# Patient Record
Sex: MALE | Race: WHITE | Employment: UNEMPLOYED | URBAN - METROPOLITAN AREA
[De-identification: names, ages, dates, MRNs, and addresses within clinical notes are randomized per-mention and may not be internally consistent; named-entity substitution may affect disease eponyms.]

---

## 2021-12-26 ENCOUNTER — HOSPITAL ENCOUNTER (EMERGENCY)
Age: 56
Discharge: HOME | End: 2021-12-26
Attending: EMERGENCY MEDICINE
Payer: COMMERCIAL

## 2021-12-26 ENCOUNTER — APPOINTMENT (OUTPATIENT)
Dept: GENERAL RADIOLOGY | Age: 56
End: 2021-12-26
Payer: COMMERCIAL

## 2021-12-26 VITALS
RESPIRATION RATE: 16 BRPM | WEIGHT: 275 LBS | OXYGEN SATURATION: 98 % | DIASTOLIC BLOOD PRESSURE: 88 MMHG | SYSTOLIC BLOOD PRESSURE: 138 MMHG | TEMPERATURE: 96.7 F | HEART RATE: 89 BPM | HEIGHT: 72 IN | BODY MASS INDEX: 37.25 KG/M2

## 2021-12-26 DIAGNOSIS — R05.9 COUGH: ICD-10-CM

## 2021-12-26 DIAGNOSIS — Z20.822 ENCOUNTER FOR LABORATORY TESTING FOR COVID-19 VIRUS: Primary | ICD-10-CM

## 2021-12-26 DIAGNOSIS — Z87.09 HISTORY OF COPD: ICD-10-CM

## 2021-12-26 PROCEDURE — 6370000000 HC RX 637 (ALT 250 FOR IP): Performed by: EMERGENCY MEDICINE

## 2021-12-26 PROCEDURE — 99284 EMERGENCY DEPT VISIT MOD MDM: CPT

## 2021-12-26 PROCEDURE — U0005 INFEC AGEN DETEC AMPLI PROBE: HCPCS

## 2021-12-26 PROCEDURE — U0003 INFECTIOUS AGENT DETECTION BY NUCLEIC ACID (DNA OR RNA); SEVERE ACUTE RESPIRATORY SYNDROME CORONAVIRUS 2 (SARS-COV-2) (CORONAVIRUS DISEASE [COVID-19]), AMPLIFIED PROBE TECHNIQUE, MAKING USE OF HIGH THROUGHPUT TECHNOLOGIES AS DESCRIBED BY CMS-2020-01-R: HCPCS

## 2021-12-26 PROCEDURE — 71046 X-RAY EXAM CHEST 2 VIEWS: CPT

## 2021-12-26 RX ORDER — BENZONATATE 100 MG/1
200 CAPSULE ORAL 3 TIMES DAILY PRN
Qty: 21 CAPSULE | Refills: 0 | Status: SHIPPED | OUTPATIENT
Start: 2021-12-26 | End: 2022-01-02

## 2021-12-26 RX ORDER — ALBUTEROL SULFATE 90 UG/1
2 AEROSOL, METERED RESPIRATORY (INHALATION) EVERY 4 HOURS PRN
Qty: 18 G | Refills: 0 | Status: SHIPPED | OUTPATIENT
Start: 2021-12-26

## 2021-12-26 RX ORDER — LORATADINE AND PSEUDOEPHEDRINE SULFATE 5; 120 MG/1; MG/1
1 TABLET, EXTENDED RELEASE ORAL 2 TIMES DAILY
Qty: 20 TABLET | Refills: 0 | Status: SHIPPED | OUTPATIENT
Start: 2021-12-26

## 2021-12-26 RX ORDER — GUAIFENESIN/DEXTROMETHORPHAN 100-10MG/5
10 SYRUP ORAL ONCE
Status: COMPLETED | OUTPATIENT
Start: 2021-12-26 | End: 2021-12-26

## 2021-12-26 RX ADMIN — GUAIFENESIN SYRUP AND DEXTROMETHORPHAN 10 ML: 100; 10 SYRUP ORAL at 22:51

## 2021-12-27 LAB — SARS-COV-2, PCR: DETECTED

## 2021-12-27 NOTE — ED PROVIDER NOTES
HPI:  12/26/21, Time: 10:20 PM TERA Cerrato is a 61 y.o. male presenting to the ED for cough, beginning 3 days ago. The complaint has been persistent, mild in severity, and worsened by nothing. Patient states he is vaccinated against COVID-19 but is possibly exposed to coworkers and he states that there has been no rash of coworkers who have tested positive for COVID-19. Patient does have history of COPD but he denies any shortness of breath currently his only complaint is a cough which is been primarily nonproductive. Patient not had any reported colored sputum production or hemoptysis. Not had any nausea vomiting no chest heaviness/pressure tightness, no rashes, no loss of taste or smell. No other complaints. Review of Systems:   A complete review of systems was performed and pertinent positives and negatives are stated within HPI, all other systems reviewed and are negative.    --------------------------------------------- PAST HISTORY ---------------------------------------------  Past Medical History:  has a past medical history of Arthritis, Asthma, Back pain, COPD (chronic obstructive pulmonary disease) ([facility] [address] Depression, Diverticulitis, GERD (gastroesophageal reflux disease), Sinus problem, and Tobacco abuse. Past Surgical History:  has a past surgical history that includes Nerve Block and Tonsillectomy and adenoidectomy. Social History:  reports that he has quit smoking. His smoking use included cigarettes. He has a 60.00 pack-year smoking history. His smokeless tobacco use includes snuff. He reports current alcohol use. He reports that he does not use drugs. Family History: family history is not on file. The patients home medications have been reviewed.     Allergies: Shellfish-derived products, Ultram [tramadol], Iodine, and Lyrica [pregabalin]    -------------------------------------------------- RESULTS -------------------------------------------------  All laboratory and radiology results have been personally reviewed by myself   LABS:  No results found for this visit on 12/26/21. RADIOLOGY:  Interpreted by Radiologist.  XR CHEST (2 VW)   Final Result   No acute process. ------------------------- NURSING NOTES AND VITALS REVIEWED ---------------------------    The nursing notes within the ED encounter and vital signs as below have been reviewed. BP (!) 140/85   Pulse 97   Temp 96.7 °F (35.9 °C)   Resp 16   Ht 6' (1.829 m)   Wt 275 lb (124.7 kg)   SpO2 95%   BMI 37.30 kg/m²  Oxygen Saturation Interpretation: Normal    ---------------------------------------------------PHYSICAL EXAM--------------------------------------    Constitutional/General: Alert and oriented x3, well appearing, non toxic in NAD  Head: Normocephalic and atraumatic  Eyes: PERRL, EOMI  Mouth: Oropharynx clear, handling secretions, no trismus  Neck: Supple, full ROM, no JVD. Trachea midline  Pulmonary: Lungs clear to auscultation bilaterally, no wheezes, rales, or rhonchi. Not in respiratory distress  Cardiovascular:  Regular rate and rhythm, no murmurs, gallops, or rubs. 2+ distal pulses  Abdomen: Soft, non tender, non distended, no organomegaly no masses no guarding no rigidity normal active bowel sounds  Extremities: Moves all extremities x 4. Warm and well perfused  Skin: warm and dry without rash  Neurologic: GCS 15, cranial nerves II through XII grossly intact no focal deficits. Psych: Normal Affect    ------------------------------ ED COURSE/MEDICAL DECISION MAKING----------------------  Medications   guaiFENesin-dextromethorphan (ROBITUSSIN DM) 100-10 MG/5ML syrup 10 mL (10 mLs Oral Given 12/26/21 3492)       ED COURSE:     Medical Decision Making:   Patient understands he is to self isolate for the next 2 to 3 days until his COVID-19 viral test result returns.   Patient does not have any symptoms of exacerbation of COPD at this time so corticosteroid therapy is not felt to be warranted. I will give the patient a prescription for Proventil inhaler in the event of any shortness of breath. Patient states his inhaler at home was several years old. Counseling: The emergency provider has spoken with the patient and discussed todays results, in addition to providing specific details for the plan of care and counseling regarding the diagnosis and prognosis. Questions are answered at this time and they are agreeable with the plan.    --------------------------------- IMPRESSION AND DISPOSITION ---------------------------------    IMPRESSION  1. Encounter for laboratory testing for COVID-19 virus    2. Cough    3. History of COPD        DISPOSITION  Disposition: Discharge to home  Patient condition is stable      NOTE: This report was transcribed using voice recognition software.  Every effort was made to ensure accuracy; however, inadvertent computerized transcription errors may be present        Michael Shaikh MD  12/26/21 6286

## 2021-12-28 ENCOUNTER — CARE COORDINATION (OUTPATIENT)
Dept: CASE MANAGEMENT | Age: 56
End: 2021-12-28

## 2021-12-28 NOTE — CARE COORDINATION
Unable to leave [employer] HIPAA compliant message for patient to return call re: ED Follow-up for Covid test positive     Phone rang several times then went to busy signal.   Called again and same thing occurred.     Tanisha Nielsen, [address] [facility] [place] Coordination Transition

## 2024-05-08 ENCOUNTER — HOSPITAL ENCOUNTER (EMERGENCY)
Age: 59
Discharge: ANOTHER HOSPITAL | End: 2024-05-09
Attending: STUDENT IN AN ORGANIZED HEALTH CARE EDUCATION/TRAINING PROGRAM
Payer: COMMERCIAL

## 2024-05-08 ENCOUNTER — APPOINTMENT (OUTPATIENT)
Dept: GENERAL RADIOLOGY | Age: 59
End: 2024-05-08
Payer: COMMERCIAL

## 2024-05-08 DIAGNOSIS — D72.829 LEUKOCYTOSIS, UNSPECIFIED TYPE: ICD-10-CM

## 2024-05-08 DIAGNOSIS — J96.01 ACUTE RESPIRATORY FAILURE WITH HYPOXIA (HCC): Primary | ICD-10-CM

## 2024-05-08 DIAGNOSIS — Z87.09 HISTORY OF COPD: ICD-10-CM

## 2024-05-08 DIAGNOSIS — J18.9 PNEUMONIA DUE TO INFECTIOUS ORGANISM, UNSPECIFIED LATERALITY, UNSPECIFIED PART OF LUNG: ICD-10-CM

## 2024-05-08 LAB
ALBUMIN SERPL-MCNC: 3.9 G/DL (ref 3.5–5.2)
ALP SERPL-CCNC: 82 U/L (ref 40–129)
ALT SERPL-CCNC: 23 U/L (ref 0–40)
ANION GAP SERPL CALCULATED.3IONS-SCNC: 12 MMOL/L (ref 7–16)
AST SERPL-CCNC: 19 U/L (ref 0–39)
BASOPHILS # BLD: 0.05 K/UL (ref 0–0.2)
BASOPHILS NFR BLD: 0 % (ref 0–2)
BILIRUB SERPL-MCNC: 1 MG/DL (ref 0–1.2)
BNP SERPL-MCNC: 45 PG/ML (ref 0–125)
BUN SERPL-MCNC: 14 MG/DL (ref 6–23)
CALCIUM SERPL-MCNC: 8.8 MG/DL (ref 8.6–10.2)
CHLORIDE SERPL-SCNC: 98 MMOL/L (ref 98–107)
CO2 SERPL-SCNC: 27 MMOL/L (ref 22–29)
CREAT SERPL-MCNC: 0.8 MG/DL (ref 0.7–1.2)
EOSINOPHIL # BLD: 0 K/UL (ref 0.05–0.5)
EOSINOPHILS RELATIVE PERCENT: 0 % (ref 0–6)
ERYTHROCYTE [DISTWIDTH] IN BLOOD BY AUTOMATED COUNT: 14.1 % (ref 11.5–15)
GFR, ESTIMATED: >90 ML/MIN/1.73M2
GLUCOSE SERPL-MCNC: 244 MG/DL (ref 74–99)
HCT VFR BLD AUTO: 45.4 % (ref 37–54)
HGB BLD-MCNC: 15.1 G/DL (ref 12.5–16.5)
IMM GRANULOCYTES # BLD AUTO: 0.06 K/UL (ref 0–0.58)
IMM GRANULOCYTES NFR BLD: 0 % (ref 0–5)
INR PPP: 1.2
LACTATE BLDV-SCNC: 1.5 MMOL/L (ref 0.5–1.9)
LYMPHOCYTES NFR BLD: 1.43 K/UL (ref 1.5–4)
LYMPHOCYTES RELATIVE PERCENT: 10 % (ref 20–42)
MAGNESIUM SERPL-MCNC: 1.7 MG/DL (ref 1.6–2.6)
MCH RBC QN AUTO: 29.1 PG (ref 26–35)
MCHC RBC AUTO-ENTMCNC: 33.3 G/DL (ref 32–34.5)
MCV RBC AUTO: 87.5 FL (ref 80–99.9)
MONOCYTES NFR BLD: 1.17 K/UL (ref 0.1–0.95)
MONOCYTES NFR BLD: 8 % (ref 2–12)
NEUTROPHILS NFR BLD: 81 % (ref 43–80)
NEUTS SEG NFR BLD: 11.22 K/UL (ref 1.8–7.3)
PARTIAL THROMBOPLASTIN TIME: 36.6 SEC (ref 24.5–35.1)
PLATELET # BLD AUTO: 180 K/UL (ref 130–450)
PMV BLD AUTO: 11.2 FL (ref 7–12)
POTASSIUM SERPL-SCNC: 3.4 MMOL/L (ref 3.5–5)
PROT SERPL-MCNC: 6.9 G/DL (ref 6.4–8.3)
PROTHROMBIN TIME: 13.5 SEC (ref 9.3–12.4)
RBC # BLD AUTO: 5.19 M/UL (ref 3.8–5.8)
SODIUM SERPL-SCNC: 137 MMOL/L (ref 132–146)
TROPONIN I SERPL HS-MCNC: 7 NG/L (ref 0–11)
WBC OTHER # BLD: 13.9 K/UL (ref 4.5–11.5)

## 2024-05-08 PROCEDURE — 96375 TX/PRO/DX INJ NEW DRUG ADDON: CPT

## 2024-05-08 PROCEDURE — 0202U NFCT DS 22 TRGT SARS-COV-2: CPT

## 2024-05-08 PROCEDURE — 87636 SARSCOV2 & INF A&B AMP PRB: CPT

## 2024-05-08 PROCEDURE — 99285 EMERGENCY DEPT VISIT HI MDM: CPT

## 2024-05-08 PROCEDURE — 93005 ELECTROCARDIOGRAM TRACING: CPT | Performed by: STUDENT IN AN ORGANIZED HEALTH CARE EDUCATION/TRAINING PROGRAM

## 2024-05-08 PROCEDURE — 80053 COMPREHEN METABOLIC PANEL: CPT

## 2024-05-08 PROCEDURE — 84484 ASSAY OF TROPONIN QUANT: CPT

## 2024-05-08 PROCEDURE — 96361 HYDRATE IV INFUSION ADD-ON: CPT

## 2024-05-08 PROCEDURE — 87040 BLOOD CULTURE FOR BACTERIA: CPT

## 2024-05-08 PROCEDURE — 83735 ASSAY OF MAGNESIUM: CPT

## 2024-05-08 PROCEDURE — 87086 URINE CULTURE/COLONY COUNT: CPT

## 2024-05-08 PROCEDURE — A4216 STERILE WATER/SALINE, 10 ML: HCPCS | Performed by: STUDENT IN AN ORGANIZED HEALTH CARE EDUCATION/TRAINING PROGRAM

## 2024-05-08 PROCEDURE — 2500000003 HC RX 250 WO HCPCS: Performed by: STUDENT IN AN ORGANIZED HEALTH CARE EDUCATION/TRAINING PROGRAM

## 2024-05-08 PROCEDURE — 81001 URINALYSIS AUTO W/SCOPE: CPT

## 2024-05-08 PROCEDURE — 85730 THROMBOPLASTIN TIME PARTIAL: CPT

## 2024-05-08 PROCEDURE — 83605 ASSAY OF LACTIC ACID: CPT

## 2024-05-08 PROCEDURE — 85610 PROTHROMBIN TIME: CPT

## 2024-05-08 PROCEDURE — 85025 COMPLETE CBC W/AUTO DIFF WBC: CPT

## 2024-05-08 PROCEDURE — 2580000003 HC RX 258: Performed by: STUDENT IN AN ORGANIZED HEALTH CARE EDUCATION/TRAINING PROGRAM

## 2024-05-08 PROCEDURE — 83880 ASSAY OF NATRIURETIC PEPTIDE: CPT

## 2024-05-08 PROCEDURE — 6360000002 HC RX W HCPCS: Performed by: STUDENT IN AN ORGANIZED HEALTH CARE EDUCATION/TRAINING PROGRAM

## 2024-05-08 RX ORDER — 0.9 % SODIUM CHLORIDE 0.9 %
1000 INTRAVENOUS SOLUTION INTRAVENOUS ONCE
Status: COMPLETED | OUTPATIENT
Start: 2024-05-08 | End: 2024-05-09

## 2024-05-08 RX ORDER — DIPHENHYDRAMINE HYDROCHLORIDE 50 MG/ML
25 INJECTION INTRAMUSCULAR; INTRAVENOUS ONCE
Status: COMPLETED | OUTPATIENT
Start: 2024-05-08 | End: 2024-05-08

## 2024-05-08 RX ADMIN — SODIUM CHLORIDE 1000 ML: 9 INJECTION, SOLUTION INTRAVENOUS at 23:24

## 2024-05-08 RX ADMIN — DIPHENHYDRAMINE HYDROCHLORIDE 25 MG: 50 INJECTION INTRAMUSCULAR; INTRAVENOUS at 23:27

## 2024-05-08 RX ADMIN — FAMOTIDINE 20 MG: 10 INJECTION, SOLUTION INTRAVENOUS at 23:30

## 2024-05-08 RX ADMIN — WATER 125 MG: 1 INJECTION INTRAMUSCULAR; INTRAVENOUS; SUBCUTANEOUS at 23:25

## 2024-05-08 ASSESSMENT — PAIN DESCRIPTION - ONSET: ONSET: ON-GOING

## 2024-05-08 ASSESSMENT — PAIN DESCRIPTION - ORIENTATION: ORIENTATION: MID

## 2024-05-08 ASSESSMENT — PAIN DESCRIPTION - DESCRIPTORS: DESCRIPTORS: ACHING;SHARP;SORE

## 2024-05-08 ASSESSMENT — PAIN - FUNCTIONAL ASSESSMENT
PAIN_FUNCTIONAL_ASSESSMENT: ACTIVITIES ARE NOT PREVENTED
PAIN_FUNCTIONAL_ASSESSMENT: NONE - DENIES PAIN
PAIN_FUNCTIONAL_ASSESSMENT: 0-10

## 2024-05-08 ASSESSMENT — PAIN DESCRIPTION - LOCATION: LOCATION: ABDOMEN;CHEST

## 2024-05-08 ASSESSMENT — PAIN DESCRIPTION - PAIN TYPE: TYPE: ACUTE PAIN

## 2024-05-08 ASSESSMENT — PAIN SCALES - GENERAL: PAINLEVEL_OUTOF10: 7

## 2024-05-08 ASSESSMENT — PAIN DESCRIPTION - FREQUENCY: FREQUENCY: CONTINUOUS

## 2024-05-09 ENCOUNTER — HOSPITAL ENCOUNTER (INPATIENT)
Age: 59
LOS: 2 days | Discharge: HOME | End: 2024-05-11
Attending: INTERNAL MEDICINE | Admitting: FAMILY MEDICINE
Payer: COMMERCIAL

## 2024-05-09 ENCOUNTER — APPOINTMENT (OUTPATIENT)
Dept: GENERAL RADIOLOGY | Age: 59
End: 2024-05-09
Payer: COMMERCIAL

## 2024-05-09 ENCOUNTER — APPOINTMENT (OUTPATIENT)
Dept: CT IMAGING | Age: 59
End: 2024-05-09
Payer: COMMERCIAL

## 2024-05-09 VITALS
WEIGHT: 263 LBS | HEIGHT: 72 IN | SYSTOLIC BLOOD PRESSURE: 104 MMHG | OXYGEN SATURATION: 94 % | RESPIRATION RATE: 24 BRPM | BODY MASS INDEX: 35.62 KG/M2 | TEMPERATURE: 99.9 F | HEART RATE: 84 BPM | DIASTOLIC BLOOD PRESSURE: 59 MMHG

## 2024-05-09 PROBLEM — J15.9 COMMUNITY ACQUIRED BACTERIAL PNEUMONIA: Status: ACTIVE | Noted: 2024-05-09

## 2024-05-09 LAB
B PARAP IS1001 DNA NPH QL NAA+NON-PROBE: NOT DETECTED
B PERT DNA SPEC QL NAA+PROBE: NOT DETECTED
BILIRUB UR QL STRIP: NEGATIVE
C PNEUM DNA NPH QL NAA+NON-PROBE: NOT DETECTED
CLARITY UR: CLEAR
COLOR UR: YELLOW
FLUAV RNA NPH QL NAA+NON-PROBE: NOT DETECTED
FLUAV RNA RESP QL NAA+PROBE: NOT DETECTED
FLUBV RNA NPH QL NAA+NON-PROBE: NOT DETECTED
FLUBV RNA RESP QL NAA+PROBE: NOT DETECTED
GLUCOSE UR STRIP-MCNC: NEGATIVE MG/DL
HADV DNA NPH QL NAA+NON-PROBE: NOT DETECTED
HCOV 229E RNA NPH QL NAA+NON-PROBE: NOT DETECTED
HCOV HKU1 RNA NPH QL NAA+NON-PROBE: NOT DETECTED
HCOV NL63 RNA NPH QL NAA+NON-PROBE: NOT DETECTED
HCOV OC43 RNA NPH QL NAA+NON-PROBE: NOT DETECTED
HGB UR QL STRIP.AUTO: NEGATIVE
HMPV RNA NPH QL NAA+NON-PROBE: DETECTED
HPIV1 RNA NPH QL NAA+NON-PROBE: NOT DETECTED
HPIV2 RNA NPH QL NAA+NON-PROBE: NOT DETECTED
HPIV3 RNA NPH QL NAA+NON-PROBE: NOT DETECTED
HPIV4 RNA NPH QL NAA+NON-PROBE: NOT DETECTED
KETONES UR STRIP-MCNC: 15 MG/DL
LACTATE BLDV-SCNC: 1 MMOL/L (ref 0.5–1.9)
LEUKOCYTE ESTERASE UR QL STRIP: NEGATIVE
M PNEUMO DNA NPH QL NAA+NON-PROBE: NOT DETECTED
NITRITE UR QL STRIP: NEGATIVE
PH UR STRIP: 6 [PH] (ref 5–9)
PROT UR STRIP-MCNC: NEGATIVE MG/DL
RBC #/AREA URNS HPF: ABNORMAL /HPF
RSV RNA NPH QL NAA+NON-PROBE: NOT DETECTED
RV+EV RNA NPH QL NAA+NON-PROBE: NOT DETECTED
SARS-COV-2 RNA NPH QL NAA+NON-PROBE: NOT DETECTED
SARS-COV-2 RNA RESP QL NAA+PROBE: NOT DETECTED
SOURCE: NORMAL
SP GR UR STRIP: <1.005 (ref 1–1.03)
SPECIMEN DESCRIPTION: ABNORMAL
SPECIMEN DESCRIPTION: NORMAL
UROBILINOGEN UR STRIP-ACNC: 0.2 EU/DL (ref 0–1)
WBC #/AREA URNS HPF: ABNORMAL /HPF

## 2024-05-09 PROCEDURE — 6360000002 HC RX W HCPCS: Performed by: STUDENT IN AN ORGANIZED HEALTH CARE EDUCATION/TRAINING PROGRAM

## 2024-05-09 PROCEDURE — 6360000004 HC RX CONTRAST MEDICATION: Performed by: RADIOLOGY

## 2024-05-09 PROCEDURE — 2700000000 HC OXYGEN THERAPY PER DAY

## 2024-05-09 PROCEDURE — 2580000003 HC RX 258: Performed by: STUDENT IN AN ORGANIZED HEALTH CARE EDUCATION/TRAINING PROGRAM

## 2024-05-09 PROCEDURE — 2580000003 HC RX 258

## 2024-05-09 PROCEDURE — 87205 SMEAR GRAM STAIN: CPT

## 2024-05-09 PROCEDURE — 87449 NOS EACH ORGANISM AG IA: CPT

## 2024-05-09 PROCEDURE — 71045 X-RAY EXAM CHEST 1 VIEW: CPT

## 2024-05-09 PROCEDURE — 97165 OT EVAL LOW COMPLEX 30 MIN: CPT

## 2024-05-09 PROCEDURE — 6370000000 HC RX 637 (ALT 250 FOR IP): Performed by: STUDENT IN AN ORGANIZED HEALTH CARE EDUCATION/TRAINING PROGRAM

## 2024-05-09 PROCEDURE — 87899 AGENT NOS ASSAY W/OPTIC: CPT

## 2024-05-09 PROCEDURE — 2500000003 HC RX 250 WO HCPCS: Performed by: STUDENT IN AN ORGANIZED HEALTH CARE EDUCATION/TRAINING PROGRAM

## 2024-05-09 PROCEDURE — 96375 TX/PRO/DX INJ NEW DRUG ADDON: CPT

## 2024-05-09 PROCEDURE — 6370000000 HC RX 637 (ALT 250 FOR IP)

## 2024-05-09 PROCEDURE — 2060000000 HC ICU INTERMEDIATE R&B

## 2024-05-09 PROCEDURE — 94664 DEMO&/EVAL PT USE INHALER: CPT

## 2024-05-09 PROCEDURE — 71275 CT ANGIOGRAPHY CHEST: CPT

## 2024-05-09 PROCEDURE — 94640 AIRWAY INHALATION TREATMENT: CPT

## 2024-05-09 PROCEDURE — 96365 THER/PROPH/DIAG IV INF INIT: CPT

## 2024-05-09 PROCEDURE — 83605 ASSAY OF LACTIC ACID: CPT

## 2024-05-09 PROCEDURE — 87070 CULTURE OTHR SPECIMN AEROBIC: CPT

## 2024-05-09 RX ORDER — SODIUM CHLORIDE 9 MG/ML
INJECTION, SOLUTION INTRAVENOUS CONTINUOUS
Status: DISCONTINUED | OUTPATIENT
Start: 2024-05-09 | End: 2024-05-10

## 2024-05-09 RX ORDER — ACETAMINOPHEN 650 MG/1
650 SUPPOSITORY RECTAL EVERY 6 HOURS PRN
Status: CANCELLED | OUTPATIENT
Start: 2024-05-09

## 2024-05-09 RX ORDER — IPRATROPIUM BROMIDE AND ALBUTEROL SULFATE 2.5; .5 MG/3ML; MG/3ML
1 SOLUTION RESPIRATORY (INHALATION)
Status: CANCELLED | OUTPATIENT
Start: 2024-05-09

## 2024-05-09 RX ORDER — ACETAMINOPHEN 650 MG/1
650 SUPPOSITORY RECTAL EVERY 6 HOURS PRN
Status: DISCONTINUED | OUTPATIENT
Start: 2024-05-09 | End: 2024-05-11 | Stop reason: HOSPADM

## 2024-05-09 RX ORDER — IPRATROPIUM BROMIDE AND ALBUTEROL SULFATE 2.5; .5 MG/3ML; MG/3ML
1 SOLUTION RESPIRATORY (INHALATION)
Status: DISCONTINUED | OUTPATIENT
Start: 2024-05-09 | End: 2024-05-11 | Stop reason: HOSPADM

## 2024-05-09 RX ORDER — DOXYCYCLINE HYCLATE 100 MG/1
100 CAPSULE ORAL EVERY 12 HOURS SCHEDULED
Status: CANCELLED | OUTPATIENT
Start: 2024-05-09 | End: 2024-05-14

## 2024-05-09 RX ORDER — ONDANSETRON 4 MG/1
4 TABLET, ORALLY DISINTEGRATING ORAL EVERY 8 HOURS PRN
Status: DISCONTINUED | OUTPATIENT
Start: 2024-05-09 | End: 2024-05-11 | Stop reason: HOSPADM

## 2024-05-09 RX ORDER — ACETAMINOPHEN 325 MG/1
650 TABLET ORAL EVERY 6 HOURS PRN
Status: CANCELLED | OUTPATIENT
Start: 2024-05-09

## 2024-05-09 RX ORDER — ONDANSETRON 4 MG/1
4 TABLET, ORALLY DISINTEGRATING ORAL EVERY 8 HOURS PRN
Status: CANCELLED | OUTPATIENT
Start: 2024-05-09

## 2024-05-09 RX ORDER — SODIUM CHLORIDE 9 MG/ML
INJECTION, SOLUTION INTRAVENOUS PRN
Status: CANCELLED | OUTPATIENT
Start: 2024-05-09

## 2024-05-09 RX ORDER — DOXYCYCLINE HYCLATE 100 MG/1
100 CAPSULE ORAL EVERY 12 HOURS SCHEDULED
Status: DISCONTINUED | OUTPATIENT
Start: 2024-05-09 | End: 2024-05-11 | Stop reason: HOSPADM

## 2024-05-09 RX ORDER — SODIUM CHLORIDE 9 MG/ML
INJECTION, SOLUTION INTRAVENOUS CONTINUOUS
Status: CANCELLED | OUTPATIENT
Start: 2024-05-09

## 2024-05-09 RX ORDER — ONDANSETRON 2 MG/ML
4 INJECTION INTRAMUSCULAR; INTRAVENOUS EVERY 6 HOURS PRN
Status: CANCELLED | OUTPATIENT
Start: 2024-05-09

## 2024-05-09 RX ORDER — ENOXAPARIN SODIUM 100 MG/ML
30 INJECTION SUBCUTANEOUS 2 TIMES DAILY
Status: CANCELLED | OUTPATIENT
Start: 2024-05-09

## 2024-05-09 RX ORDER — SODIUM CHLORIDE 0.9 % (FLUSH) 0.9 %
10 SYRINGE (ML) INJECTION PRN
Status: DISCONTINUED | OUTPATIENT
Start: 2024-05-09 | End: 2024-05-11 | Stop reason: HOSPADM

## 2024-05-09 RX ORDER — ENOXAPARIN SODIUM 100 MG/ML
30 INJECTION SUBCUTANEOUS 2 TIMES DAILY
Status: DISCONTINUED | OUTPATIENT
Start: 2024-05-09 | End: 2024-05-11 | Stop reason: HOSPADM

## 2024-05-09 RX ORDER — GUAIFENESIN/DEXTROMETHORPHAN 100-10MG/5
5 SYRUP ORAL EVERY 4 HOURS PRN
Status: DISCONTINUED | OUTPATIENT
Start: 2024-05-09 | End: 2024-05-11 | Stop reason: HOSPADM

## 2024-05-09 RX ORDER — SODIUM CHLORIDE 0.9 % (FLUSH) 0.9 %
5-40 SYRINGE (ML) INJECTION EVERY 12 HOURS SCHEDULED
Status: CANCELLED | OUTPATIENT
Start: 2024-05-09

## 2024-05-09 RX ORDER — SODIUM CHLORIDE 9 MG/ML
INJECTION, SOLUTION INTRAVENOUS PRN
Status: DISCONTINUED | OUTPATIENT
Start: 2024-05-09 | End: 2024-05-11 | Stop reason: HOSPADM

## 2024-05-09 RX ORDER — SODIUM CHLORIDE 0.9 % (FLUSH) 0.9 %
10 SYRINGE (ML) INJECTION PRN
Status: CANCELLED | OUTPATIENT
Start: 2024-05-09

## 2024-05-09 RX ORDER — ACETAMINOPHEN 325 MG/1
650 TABLET ORAL EVERY 6 HOURS PRN
Status: DISCONTINUED | OUTPATIENT
Start: 2024-05-09 | End: 2024-05-11 | Stop reason: HOSPADM

## 2024-05-09 RX ORDER — GUAIFENESIN/DEXTROMETHORPHAN 100-10MG/5
5 SYRUP ORAL EVERY 4 HOURS PRN
Status: CANCELLED | OUTPATIENT
Start: 2024-05-09

## 2024-05-09 RX ORDER — ONDANSETRON 2 MG/ML
4 INJECTION INTRAMUSCULAR; INTRAVENOUS EVERY 6 HOURS PRN
Status: DISCONTINUED | OUTPATIENT
Start: 2024-05-09 | End: 2024-05-11 | Stop reason: HOSPADM

## 2024-05-09 RX ORDER — IPRATROPIUM BROMIDE AND ALBUTEROL SULFATE 2.5; .5 MG/3ML; MG/3ML
1 SOLUTION RESPIRATORY (INHALATION) ONCE
Status: COMPLETED | OUTPATIENT
Start: 2024-05-09 | End: 2024-05-09

## 2024-05-09 RX ORDER — SODIUM CHLORIDE 0.9 % (FLUSH) 0.9 %
5-40 SYRINGE (ML) INJECTION EVERY 12 HOURS SCHEDULED
Status: DISCONTINUED | OUTPATIENT
Start: 2024-05-09 | End: 2024-05-11 | Stop reason: HOSPADM

## 2024-05-09 RX ADMIN — DOXYCYCLINE 100 MG: 100 INJECTION, POWDER, LYOPHILIZED, FOR SOLUTION INTRAVENOUS at 01:59

## 2024-05-09 RX ADMIN — IPRATROPIUM BROMIDE AND ALBUTEROL SULFATE 1 DOSE: 2.5; .5 SOLUTION RESPIRATORY (INHALATION) at 01:53

## 2024-05-09 RX ADMIN — SODIUM CHLORIDE: 9 INJECTION, SOLUTION INTRAVENOUS at 07:50

## 2024-05-09 RX ADMIN — IPRATROPIUM BROMIDE AND ALBUTEROL SULFATE 1 DOSE: 2.5; .5 SOLUTION RESPIRATORY (INHALATION) at 19:11

## 2024-05-09 RX ADMIN — IPRATROPIUM BROMIDE AND ALBUTEROL SULFATE 1 DOSE: 2.5; .5 SOLUTION RESPIRATORY (INHALATION) at 08:37

## 2024-05-09 RX ADMIN — SODIUM CHLORIDE, PRESERVATIVE FREE 10 ML: 5 INJECTION INTRAVENOUS at 07:53

## 2024-05-09 RX ADMIN — WATER 1000 MG: 1 INJECTION INTRAMUSCULAR; INTRAVENOUS; SUBCUTANEOUS at 01:53

## 2024-05-09 RX ADMIN — SODIUM CHLORIDE, PRESERVATIVE FREE 10 ML: 5 INJECTION INTRAVENOUS at 21:40

## 2024-05-09 RX ADMIN — IPRATROPIUM BROMIDE AND ALBUTEROL SULFATE 1 DOSE: 2.5; .5 SOLUTION RESPIRATORY (INHALATION) at 16:01

## 2024-05-09 RX ADMIN — DOXYCYCLINE HYCLATE 100 MG: 100 CAPSULE ORAL at 13:25

## 2024-05-09 RX ADMIN — IOPAMIDOL 75 ML: 755 INJECTION, SOLUTION INTRAVENOUS at 00:32

## 2024-05-09 RX ADMIN — SODIUM CHLORIDE: 9 INJECTION, SOLUTION INTRAVENOUS at 21:32

## 2024-05-09 RX ADMIN — IPRATROPIUM BROMIDE AND ALBUTEROL SULFATE 1 DOSE: 2.5; .5 SOLUTION RESPIRATORY (INHALATION) at 12:34

## 2024-05-09 ASSESSMENT — ENCOUNTER SYMPTOMS
VOMITING: 0
NAUSEA: 0
SHORTNESS OF BREATH: 1
ABDOMINAL PAIN: 0
CONSTIPATION: 0
DIARRHEA: 0
COUGH: 1

## 2024-05-09 ASSESSMENT — PAIN SCALES - GENERAL
PAINLEVEL_OUTOF10: 0

## 2024-05-09 ASSESSMENT — PAIN - FUNCTIONAL ASSESSMENT
PAIN_FUNCTIONAL_ASSESSMENT: NONE - DENIES PAIN

## 2024-05-09 NOTE — CARE COORDINATION
Introduced my self and provided explanation of CM role to patient.  Patient is awake, alert, and aware of current diagnosis and treatment plan including iv antibiotics, nebulizer therapy, supplemental oxygen, cardiac monitoring.  He voices he resides at home with his spouse and completes his adl's with independence.  Patient is established with a pcp and denies any issue with retail pharmaceutical coverage. He is tracking down insurance information/card d/t recent employer change.  Once available, he will provide information so it can be placed on chart. He does not have home O2; he is hopeful to be weaned to room air.  If no supplemental O2 required at discharge, patient denies home going needs.  Patient will need followed and assisted with discharge planning as necessary.   Gus Betancourt, WILIAM RN  [facility] Case Management  [phone]

## 2024-05-09 NOTE — PROGRESS NOTES
Physical Therapy  Facility/Department: [address] INTERNAL MEDICINE 2    Name: Ralph Escobar  : 1965  MRN: [id number]    Order received and chart reviewed.  Pt found independently ambulating around his room and managing his O2 line.  Pt does not need PT at this time.  Will discontinue PT order.     Yaquelin Bacon, PT [id number]

## 2024-05-09 NOTE — PROGRESS NOTES
Database initiated pharmacy and medications verified with the patient. He is A&O independent from home with wife. States he takes no home medications.

## 2024-05-09 NOTE — ED NOTES
Patient access called with room assignment: [facility]  403  Called [facility] with above: ETA for transport: 830 am

## 2024-05-09 NOTE — ED PROVIDER NOTES
personal history and physicial examination, re-evaluation prior to disposition, multiple bedside re-evaluations, IV medications, cardiac monitoring, continuous pulse oximetry, and complex medical decision making and emergency management    This patient has remained hemodynamically stable during their ED course.    Counseling:   The emergency provider has spoken with the patient and spouse/SO and discussed today’s results, in addition to providing specific details for the plan of care and counseling regarding the diagnosis and prognosis.  Questions are answered at this time and they are agreeable with the plan.    CONSULTS:   None       Please see ED course for any additional MDM documentation.       CRITICAL CARE TIME (.cct)     Please note that the withdrawal or failure to initiate urgent interventions for this patient would likely result in a life threatening deterioration or permanent disability.   Accordingly this patient received 36 minutes of critical care time, excluding separately billable procedures.            I am the Primary Clinician of Record.    FINAL IMPRESSION      1. Acute respiratory failure with hypoxia (HCC)    2. Pneumonia due to infectious organism, unspecified laterality, unspecified part of lung    3. Leukocytosis, unspecified type    4. History of COPD          DISPOSITION/PLAN     DISPOSITION Decision To Transfer 05/09/2024 01:31:04 AM    Disposition: Transfer to [facility]; Reason for transfer: Admission, higher level of care, services not available at this facility, pneumonia, acute hypoxic respiratory failure.  Patient condition is stable      PATIENT REFERRED TO:  No follow-up provider specified.    DISCHARGE MEDICATIONS:  New Prescriptions    No medications on file       DISCONTINUED MEDICATIONS:  Discontinued Medications    ALBUTEROL SULFATE HFA (PROVENTIL HFA) 108 (90 BASE) MCG/ACT INHALER    Inhale 2 puffs into the lungs every 4 hours as needed for Shortness

## 2024-05-09 NOTE — ED NOTES
[facility] dispatch called to let us know that his son works for [employer] and they will be transporting him to [facility]. ETA 635am. Called [facility] to cancel transport.

## 2024-05-09 NOTE — PROGRESS NOTES
OCCUPATIONAL THERAPY INITIAL EVALUATION    [facility]   [address]        Date:2024                                                  Patient Name: Ralph Escobar    MRN: [id number]    : 1965    Room: [address] [place] [address]    Evaluating OT: Steffanie Diamond OTR/L #[id number]    Referring Provider:  Johana Jeronimo APRN - CNP     Specific Provider Orders/Date:  OT Eval and Treat , 2024     Diagnosis:   No diagnosis found.       Surgery: None       Pertinent Medical History: Asthma, COPD, arthritis      Precautions:  O2 - new user (3L) , droplet precautions      Assessment of current deficits    [] Functional mobility  []ADLs  [] Strength               []Cognition    [] Functional transfers   [] IADLs         [] Safety Awareness   [x]Endurance    [] Fine Coordination              [] Balance      [] Vision/perception   []Sensation     []Gross Motor Coordination  [] ROM  [] Delirium                   [] Motor Control     OT PLAN OF CARE   OT POC based on physician orders, patient diagnosis and results of clinical assessment    Recommended Adaptive Equipment: None      Home Living: Lives with wife, single family home, 2 story with 1st floor set-up, No steps to enter.          Equipment owned: None      Prior Level of Function: Independent with ADLs and with IADLs. Ambulated independently without AD.     Driving: Yes  Occupation:      Pain Level: No c/o pain     Cognition: A&O: 4/4; Follows 3 step directions   Memory: good    Sequencing: good    Problem solving: good    Judgement/safety: fair+     Functional Assessment: AM-PAC Daily Activity Raw Score: 23/24   Initial Eval Status  Date: 24     Feeding Independent      Grooming Independent      UB Dressing Independent     LB Dressing Independent      Bathing Supervision      Toileting Independent      Bed Mobility  Supine to sit: Independent   Sit to supine:  N/A     Functional

## 2024-05-09 NOTE — PLAN OF CARE
Problem: Discharge Planning  Goal: Discharge to home or other facility with appropriate resources  Outcome: Progressing  Flowsheets (Taken 5/9/2024 0948 by Luh Brandon, RN)  Discharge to home or other facility with appropriate resources: Refer to discharge planning if patient needs post-hospital services based on physician order or complex needs related to functional status, cognitive ability or social support system     Problem: Safety - Adult  Goal: Free from fall injury  Outcome: Progressing     Problem: Pain  Goal: Verbalizes/displays adequate comfort level or baseline comfort level  Outcome: Progressing

## 2024-05-09 NOTE — ED NOTES
Encounter Summary, Demographics, and Certification for [facility] faxed to [facility] per their request at [phone]

## 2024-05-09 NOTE — ACP (ADVANCE CARE PLANNING)
Advance Care Planning   Healthcare Decision Maker:    Primary Decision Maker: Alejandro Escobarne - [facility] - [phone]    Click here to complete Healthcare Decision Makers including selection of the Healthcare Decision Maker Relationship (ie \"Primary\").

## 2024-05-10 LAB
ALBUMIN SERPL-MCNC: 3.3 G/DL (ref 3.5–5.2)
ALP SERPL-CCNC: 74 U/L (ref 40–129)
ALT SERPL-CCNC: 15 U/L (ref 0–40)
ANION GAP SERPL CALCULATED.3IONS-SCNC: 8 MMOL/L (ref 7–16)
AST SERPL-CCNC: 12 U/L (ref 0–39)
BASOPHILS # BLD: 0.02 K/UL (ref 0–0.2)
BASOPHILS NFR BLD: 0 % (ref 0–2)
BILIRUB SERPL-MCNC: 0.3 MG/DL (ref 0–1.2)
BUN SERPL-MCNC: 17 MG/DL (ref 6–23)
CALCIUM SERPL-MCNC: 8.5 MG/DL (ref 8.6–10.2)
CHLORIDE SERPL-SCNC: 106 MMOL/L (ref 98–107)
CO2 SERPL-SCNC: 28 MMOL/L (ref 22–29)
CREAT SERPL-MCNC: 0.7 MG/DL (ref 0.7–1.2)
EKG ATRIAL RATE: 110 BPM
EKG P AXIS: 46 DEGREES
EKG P-R INTERVAL: 128 MS
EKG Q-T INTERVAL: 346 MS
EKG QRS DURATION: 82 MS
EKG QTC CALCULATION (BAZETT): 468 MS
EKG R AXIS: 56 DEGREES
EKG T AXIS: 41 DEGREES
EKG VENTRICULAR RATE: 110 BPM
EOSINOPHIL # BLD: 0.01 K/UL (ref 0.05–0.5)
EOSINOPHILS RELATIVE PERCENT: 0 % (ref 0–6)
ERYTHROCYTE [DISTWIDTH] IN BLOOD BY AUTOMATED COUNT: 14.2 % (ref 11.5–15)
GFR, ESTIMATED: >90 ML/MIN/1.73M2
GLUCOSE BLD-MCNC: 246 MG/DL (ref 74–99)
GLUCOSE BLD-MCNC: 258 MG/DL (ref 74–99)
GLUCOSE BLD-MCNC: 261 MG/DL (ref 74–99)
GLUCOSE SERPL-MCNC: 223 MG/DL (ref 74–99)
HBA1C MFR BLD: 8.5 % (ref 4–5.6)
HCT VFR BLD AUTO: 42.9 % (ref 37–54)
HGB BLD-MCNC: 13.2 G/DL (ref 12.5–16.5)
IMM GRANULOCYTES # BLD AUTO: 0.04 K/UL (ref 0–0.58)
IMM GRANULOCYTES NFR BLD: 0 % (ref 0–5)
L PNEUMO1 AG UR QL IA.RAPID: NEGATIVE
LYMPHOCYTES NFR BLD: 2.33 K/UL (ref 1.5–4)
LYMPHOCYTES RELATIVE PERCENT: 20 % (ref 20–42)
MCH RBC QN AUTO: 28.9 PG (ref 26–35)
MCHC RBC AUTO-ENTMCNC: 30.8 G/DL (ref 32–34.5)
MCV RBC AUTO: 94.1 FL (ref 80–99.9)
MICROORGANISM SPEC CULT: ABNORMAL
MICROORGANISM SPEC CULT: NORMAL
MICROORGANISM/AGENT SPEC: NORMAL
MONOCYTES NFR BLD: 0.68 K/UL (ref 0.1–0.95)
MONOCYTES NFR BLD: 6 % (ref 2–12)
NEUTROPHILS NFR BLD: 74 % (ref 43–80)
NEUTS SEG NFR BLD: 8.75 K/UL (ref 1.8–7.3)
PLATELET # BLD AUTO: 126 K/UL (ref 130–450)
PMV BLD AUTO: 10.7 FL (ref 7–12)
POTASSIUM SERPL-SCNC: 3.7 MMOL/L (ref 3.5–5)
PROCALCITONIN SERPL-MCNC: 0.1 NG/ML (ref 0–0.08)
PROT SERPL-MCNC: 6 G/DL (ref 6.4–8.3)
RBC # BLD AUTO: 4.56 M/UL (ref 3.8–5.8)
S PNEUM AG SPEC QL: NEGATIVE
SODIUM SERPL-SCNC: 142 MMOL/L (ref 132–146)
SPECIMEN DESCRIPTION: ABNORMAL
SPECIMEN DESCRIPTION: NORMAL
SPECIMEN SOURCE: NORMAL
WBC OTHER # BLD: 11.8 K/UL (ref 4.5–11.5)

## 2024-05-10 PROCEDURE — 2700000000 HC OXYGEN THERAPY PER DAY

## 2024-05-10 PROCEDURE — 6370000000 HC RX 637 (ALT 250 FOR IP)

## 2024-05-10 PROCEDURE — 85025 COMPLETE CBC W/AUTO DIFF WBC: CPT

## 2024-05-10 PROCEDURE — 84145 PROCALCITONIN (PCT): CPT

## 2024-05-10 PROCEDURE — 93010 ELECTROCARDIOGRAM REPORT: CPT | Performed by: INTERNAL MEDICINE

## 2024-05-10 PROCEDURE — 6370000000 HC RX 637 (ALT 250 FOR IP): Performed by: FAMILY MEDICINE

## 2024-05-10 PROCEDURE — 6360000002 HC RX W HCPCS

## 2024-05-10 PROCEDURE — 83036 HEMOGLOBIN GLYCOSYLATED A1C: CPT

## 2024-05-10 PROCEDURE — 36415 COLL VENOUS BLD VENIPUNCTURE: CPT

## 2024-05-10 PROCEDURE — 2060000000 HC ICU INTERMEDIATE R&B

## 2024-05-10 PROCEDURE — 80053 COMPREHEN METABOLIC PANEL: CPT

## 2024-05-10 PROCEDURE — 94640 AIRWAY INHALATION TREATMENT: CPT

## 2024-05-10 PROCEDURE — 82962 GLUCOSE BLOOD TEST: CPT

## 2024-05-10 PROCEDURE — 2580000003 HC RX 258

## 2024-05-10 RX ORDER — INSULIN LISPRO 100 [IU]/ML
0-4 INJECTION, SOLUTION INTRAVENOUS; SUBCUTANEOUS NIGHTLY
Status: DISCONTINUED | OUTPATIENT
Start: 2024-05-10 | End: 2024-05-11 | Stop reason: HOSPADM

## 2024-05-10 RX ORDER — DEXTROSE MONOHYDRATE 100 MG/ML
INJECTION, SOLUTION INTRAVENOUS CONTINUOUS PRN
Status: DISCONTINUED | OUTPATIENT
Start: 2024-05-10 | End: 2024-05-11 | Stop reason: HOSPADM

## 2024-05-10 RX ORDER — GLUCAGON 1 MG/ML
1 KIT INJECTION PRN
Status: DISCONTINUED | OUTPATIENT
Start: 2024-05-10 | End: 2024-05-11 | Stop reason: HOSPADM

## 2024-05-10 RX ORDER — INSULIN LISPRO 100 [IU]/ML
0-8 INJECTION, SOLUTION INTRAVENOUS; SUBCUTANEOUS
Status: DISCONTINUED | OUTPATIENT
Start: 2024-05-10 | End: 2024-05-11 | Stop reason: HOSPADM

## 2024-05-10 RX ADMIN — DOXYCYCLINE HYCLATE 100 MG: 100 CAPSULE ORAL at 08:59

## 2024-05-10 RX ADMIN — WATER 1000 MG: 1 INJECTION INTRAMUSCULAR; INTRAVENOUS; SUBCUTANEOUS at 03:10

## 2024-05-10 RX ADMIN — SODIUM CHLORIDE, PRESERVATIVE FREE 10 ML: 5 INJECTION INTRAVENOUS at 20:58

## 2024-05-10 RX ADMIN — GUAIFENESIN AND DEXTROMETHORPHAN 5 ML: 100; 10 SYRUP ORAL at 23:59

## 2024-05-10 RX ADMIN — SODIUM CHLORIDE, PRESERVATIVE FREE 10 ML: 5 INJECTION INTRAVENOUS at 08:59

## 2024-05-10 RX ADMIN — DOXYCYCLINE HYCLATE 100 MG: 100 CAPSULE ORAL at 20:57

## 2024-05-10 RX ADMIN — INSULIN LISPRO 4 UNITS: 100 INJECTION, SOLUTION INTRAVENOUS; SUBCUTANEOUS at 14:06

## 2024-05-10 RX ADMIN — IPRATROPIUM BROMIDE AND ALBUTEROL SULFATE 1 DOSE: 2.5; .5 SOLUTION RESPIRATORY (INHALATION) at 15:21

## 2024-05-10 RX ADMIN — SODIUM CHLORIDE: 9 INJECTION, SOLUTION INTRAVENOUS at 09:00

## 2024-05-10 RX ADMIN — IPRATROPIUM BROMIDE AND ALBUTEROL SULFATE 1 DOSE: 2.5; .5 SOLUTION RESPIRATORY (INHALATION) at 11:02

## 2024-05-10 RX ADMIN — IPRATROPIUM BROMIDE AND ALBUTEROL SULFATE 1 DOSE: 2.5; .5 SOLUTION RESPIRATORY (INHALATION) at 08:04

## 2024-05-10 RX ADMIN — IPRATROPIUM BROMIDE AND ALBUTEROL SULFATE 1 DOSE: 2.5; .5 SOLUTION RESPIRATORY (INHALATION) at 19:48

## 2024-05-10 RX ADMIN — INSULIN LISPRO 2 UNITS: 100 INJECTION, SOLUTION INTRAVENOUS; SUBCUTANEOUS at 17:30

## 2024-05-10 ASSESSMENT — PAIN SCALES - GENERAL
PAINLEVEL_OUTOF10: 0
PAINLEVEL_OUTOF10: 0

## 2024-05-10 NOTE — H&P
[facility]  History and Physical      CHIEF COMPLAINT:    No chief complaint on file.       Patient of CallyHerbert  presents with:  Community acquired bacterial pneumonia    History of Present Illness:   Patient is a 59-year-old male with past medical history COPD, depression, GERD, tobacco abuse who presents to the emergency room at [place] for a chief complaint of shortness of breath.  On arrival patient was found to be mildly hypokalemic at 3.4, hyperglycemic at 244, leukocytosis of 13.9 with a respiratory panel positive for human metapneumovirus. A CTA pulmonary revealed bilateral lung base opacities most likely pneumonia no definite pulmonary embolism seen.  It was [facility] and [facility]      REVIEW OF SYSTEMS:  Pertinent negatives are above in HPI.  10 point ROS otherwise negative.      Past Medical History:   Diagnosis Date    Arthritis     Asthma     Back pain     COPD (chronic obstructive pulmonary disease) (HCC)     Depression     Diverticulitis     GERD (gastroesophageal reflux disease)     Sinus problem     Tobacco abuse          Past Surgical History:   Procedure Laterality Date    NERVE BLOCK      Back.    TONSILLECTOMY AND ADENOIDECTOMY         Medications Prior to Admission:    No medications prior to admission.    Note that the patient's home medications were reviewed and the above list is accurate to the best of my knowledge at the time of the exam.    Allergies:    Shellfish-derived products, Ultram [tramadol], Iodine, and Lyrica [pregabalin]    Social History:    reports that he has quit smoking. His smoking use included cigarettes. He has a 60.0 pack-year smoking history. His smokeless tobacco use includes snuff. He reports current alcohol use. He reports that he does not use drugs.    Family History:   family history is not on file.      PHYSICAL EXAM:    Vitals:  BP (!)  144/77   Pulse 77   Temp 98 °F (36.7 °C) (Oral)   Resp 18   Ht 1.829 m (6')   Wt 121.2 kg (267 lb 3.2 oz)   SpO2 94%   BMI 36.24 kg/m²       General appearance: NAD, conversant, obese  Eyes: Sclerae anicteric, PERRLA  HEENT: AT/NC, MMM  Neck: FROM, supple, no thyromegaly  Lymph: No cervical / supraclavicular lymphadenopathy  Lungs: diminished bl, on o2 via nc  CV: RRR, no MRGs, no lower extremity edema  Abdomen: Soft, non-tender; no masses or HSM, +BS  Extremities: FROM without synovitis.  No clubbing or cyanosis of the hands.  Skin: no rash, induration, lesions, or ulcers  Psych: Calm and cooperative.  Normal judgement and insight.  Normal mood and affect.  Neuro: Alert and interactive, face symmetric, speech fluent.    LABS:  All labs reviewed.  Of note:  CBC:   Lab Results   Component Value Date/Time    WBC 11.8 05/10/2024 08:01 AM    RBC 4.56 05/10/2024 08:01 AM    HGB 13.2 05/10/2024 08:01 AM    HCT 42.9 05/10/2024 08:01 AM    MCV 94.1 05/10/2024 08:01 AM    MCH 28.9 05/10/2024 08:01 AM    MCHC 30.8 05/10/2024 08:01 AM    RDW 14.2 05/10/2024 08:01 AM     05/10/2024 08:01 AM    MPV 10.7 05/10/2024 08:01 AM     CMP:    Lab Results   Component Value Date/Time     05/10/2024 05:34 AM    K 3.7 05/10/2024 05:34 AM     05/10/2024 05:34 AM    CO2 28 05/10/2024 05:34 AM    BUN 17 05/10/2024 05:34 AM    CREATININE 0.7 05/10/2024 05:34 AM    GFRAA >60 06/14/2017 07:19 PM    LABGLOM >90 05/10/2024 05:34 AM    GLUCOSE 223 05/10/2024 05:34 AM    CALCIUM 8.5 05/10/2024 05:34 AM    BILITOT 0.3 05/10/2024 05:34 AM    ALKPHOS 74 05/10/2024 05:34 AM    AST 12 05/10/2024 05:34 AM    ALT 15 05/10/2024 05:34 AM       Imaging:  CTA pulmonary with bilateral lung base opacities most likely pneumonia no definite pulmonary embolism seen    EKG:  Sinus tachycardia      ASSESSMENT/PLAN:  Principal Problem:    Community acquired bacterial pneumonia  Resolved Problems:    * No resolved hospital problems.

## 2024-05-10 NOTE — PROGRESS NOTES
Reason for consult:  New Dx Type 2    A1C: 8.5% (5/10/24)            Patient states the following concerns/barriers to diabetes self-management:     [x] None       [] Medication cost   [] Food cost/availability        [] Reading  [] Hearing   [] Vision                [] Work    [] Transportation  [] No insurance  [] Physical limitations    [] Other:    Patient states the following about their diabetes/health:   [x]   Family history- mother- states that he has good understanding of diabetes   [x]   2 meals/day- lunch and dinner  [x]   Drinks water 72 oz, 2-3 bottles(16 oz) sweetened tea ( in room at time of education), 2 coffees/day with 2 sugar packets  [x]   , unloads at dock ( physically active at that time)     Diabetes survival packet provided to:   [x] Patient     [] Other:    Information given:   Definition of diabetes   Target glucose ranges/A1C   Self-monitoring of blood glucose   Prevention/symptoms/treatment of hypo-/hyperglycemia   Medication adherence             The plate method/meal planning guidelines             The benefits of exercise and recommendations             Reducing the risk of chronic complications     Diabetes medications reviewed (use, purpose, action): Metformin          Post-education Assessment  [x]  Attentive to teaching  [x]  Answered questions appropriately when asked   [x]  Seems able to apply concepts to daily lifestyle  [x]  Seems motivated to do well  [x]  Verbalized an understanding of plate method  [x]  Verbalized an understanding of prescribed antidiabetes medications   [x]  Verbalized an understanding of target glucose ranges/A1C level  [x]  Expresses an intent to comply with treatment plan   []  Showed very little interest in complying with treatment plan   []  Seems to have trouble applying concepts to daily lifestyle           Recommendations:   [x] Carbohydrate-controlled diet    [x] Script for glucometer and supplies (per preference of patient's

## 2024-05-10 NOTE — PLAN OF CARE
Problem: Discharge Planning  Goal: Discharge to home or other facility with appropriate resources  5/10/2024 0958 by Madelaine Staton RN  Outcome: Progressing  5/10/2024 0027 by Oksana Huizar RN  Outcome: Progressing     Problem: Safety - Adult  Goal: Free from fall injury  5/10/2024 0958 by Madelaine Staton RN  Outcome: Progressing  5/10/2024 0027 by Oksana Huizar RN  Outcome: Progressing

## 2024-05-10 NOTE — PLAN OF CARE
Problem: Discharge Planning  Goal: Discharge to home or other facility with appropriate resources  5/10/2024 1852 by Diane Brandon, RN  Outcome: Progressing  5/10/2024 0958 by Madelaine Staton RN  Outcome: Progressing     Problem: Safety - Adult  Goal: Free from fall injury  5/10/2024 1852 by Diane Brandon, RN  Outcome: Progressing  5/10/2024 0958 by Madelaine Staton, RN  Outcome: Progressing

## 2024-05-10 NOTE — PLAN OF CARE
Problem: Discharge Planning  Goal: Discharge to home or other facility with appropriate resources  5/10/2024 0027 by Oksana Huizar RN  Outcome: Progressing  5/9/2024 1034 by Madelaine Staton RN  Outcome: Progressing  Flowsheets (Taken 5/9/2024 0948 by Luh Brandon, RN)  Discharge to home or other facility with appropriate resources: Refer to discharge planning if patient needs post-hospital services based on physician order or complex needs related to functional status, cognitive ability or social support system     Problem: Safety - Adult  Goal: Free from fall injury  5/10/2024 0027 by Oksana Huizar RN  Outcome: Progressing  5/9/2024 1034 by Madelaine Staton RN  Outcome: Progressing     Problem: Pain  Goal: Verbalizes/displays adequate comfort level or baseline comfort level  5/10/2024 0027 by Oksana Huizar RN  Outcome: Progressing  5/9/2024 1034 by Madelaine Staton RN  Outcome: Progressing

## 2024-05-11 VITALS
OXYGEN SATURATION: 94 % | TEMPERATURE: 98 F | DIASTOLIC BLOOD PRESSURE: 86 MMHG | RESPIRATION RATE: 16 BRPM | HEIGHT: 72 IN | HEART RATE: 92 BPM | SYSTOLIC BLOOD PRESSURE: 146 MMHG | WEIGHT: 267.2 LBS | BODY MASS INDEX: 36.19 KG/M2

## 2024-05-11 PROBLEM — J12.3 PNEUMONIA DUE TO HUMAN METAPNEUMOVIRUS: Status: ACTIVE | Noted: 2024-05-11

## 2024-05-11 LAB
ALBUMIN SERPL-MCNC: 3.5 G/DL (ref 3.5–5.2)
ALP SERPL-CCNC: 74 U/L (ref 40–129)
ALT SERPL-CCNC: 16 U/L (ref 0–40)
ANION GAP SERPL CALCULATED.3IONS-SCNC: 10 MMOL/L (ref 7–16)
AST SERPL-CCNC: 13 U/L (ref 0–39)
ATYPICAL LYMPHOCYTE ABSOLUTE COUNT: 0.07 K/UL (ref 0–0.46)
ATYPICAL LYMPHOCYTES: 1 % (ref 0–4)
BASOPHILS # BLD: 0 K/UL (ref 0–0.2)
BASOPHILS NFR BLD: 0 % (ref 0–2)
BILIRUB SERPL-MCNC: 0.2 MG/DL (ref 0–1.2)
BUN SERPL-MCNC: 15 MG/DL (ref 6–23)
CALCIUM SERPL-MCNC: 8.4 MG/DL (ref 8.6–10.2)
CHLORIDE SERPL-SCNC: 104 MMOL/L (ref 98–107)
CO2 SERPL-SCNC: 29 MMOL/L (ref 22–29)
CREAT SERPL-MCNC: 0.7 MG/DL (ref 0.7–1.2)
EOSINOPHIL # BLD: 0 K/UL (ref 0.05–0.5)
EOSINOPHILS RELATIVE PERCENT: 0 % (ref 0–6)
ERYTHROCYTE [DISTWIDTH] IN BLOOD BY AUTOMATED COUNT: 14 % (ref 11.5–15)
GFR, ESTIMATED: >90 ML/MIN/1.73M2
GLUCOSE BLD-MCNC: 182 MG/DL (ref 74–99)
GLUCOSE BLD-MCNC: 207 MG/DL (ref 74–99)
GLUCOSE SERPL-MCNC: 267 MG/DL (ref 74–99)
HCT VFR BLD AUTO: 41.1 % (ref 37–54)
HGB BLD-MCNC: 13.1 G/DL (ref 12.5–16.5)
LYMPHOCYTES NFR BLD: 2.24 K/UL (ref 1.5–4)
LYMPHOCYTES RELATIVE PERCENT: 27 % (ref 20–42)
MAGNESIUM SERPL-MCNC: 1.8 MG/DL (ref 1.6–2.6)
MCH RBC QN AUTO: 28.9 PG (ref 26–35)
MCHC RBC AUTO-ENTMCNC: 31.9 G/DL (ref 32–34.5)
MCV RBC AUTO: 90.5 FL (ref 80–99.9)
MONOCYTES NFR BLD: 0.36 K/UL (ref 0.1–0.95)
MONOCYTES NFR BLD: 4 % (ref 2–12)
NEUTROPHILS NFR BLD: 68 % (ref 43–80)
NEUTS SEG NFR BLD: 5.72 K/UL (ref 1.8–7.3)
PLATELET # BLD AUTO: 150 K/UL (ref 130–450)
PMV BLD AUTO: 10.8 FL (ref 7–12)
POTASSIUM SERPL-SCNC: 3.3 MMOL/L (ref 3.5–5)
PROT SERPL-MCNC: 5.8 G/DL (ref 6.4–8.3)
RBC # BLD AUTO: 4.54 M/UL (ref 3.8–5.8)
RBC # BLD: NORMAL 10*6/UL
SODIUM SERPL-SCNC: 143 MMOL/L (ref 132–146)
WBC OTHER # BLD: 8.4 K/UL (ref 4.5–11.5)

## 2024-05-11 PROCEDURE — 80053 COMPREHEN METABOLIC PANEL: CPT

## 2024-05-11 PROCEDURE — 85025 COMPLETE CBC W/AUTO DIFF WBC: CPT

## 2024-05-11 PROCEDURE — 6360000002 HC RX W HCPCS

## 2024-05-11 PROCEDURE — 94640 AIRWAY INHALATION TREATMENT: CPT

## 2024-05-11 PROCEDURE — 83735 ASSAY OF MAGNESIUM: CPT

## 2024-05-11 PROCEDURE — 6370000000 HC RX 637 (ALT 250 FOR IP): Performed by: FAMILY MEDICINE

## 2024-05-11 PROCEDURE — 2580000003 HC RX 258

## 2024-05-11 PROCEDURE — 6370000000 HC RX 637 (ALT 250 FOR IP)

## 2024-05-11 PROCEDURE — 2700000000 HC OXYGEN THERAPY PER DAY

## 2024-05-11 PROCEDURE — 82962 GLUCOSE BLOOD TEST: CPT

## 2024-05-11 RX ORDER — BLOOD-GLUCOSE METER
1 KIT MISCELLANEOUS DAILY
Qty: 100 EACH | Refills: 3 | Status: SHIPPED | OUTPATIENT
Start: 2024-05-11

## 2024-05-11 RX ORDER — BLOOD-GLUCOSE METER
1 KIT MISCELLANEOUS DAILY
Qty: 1 KIT | Refills: 0 | Status: SHIPPED | OUTPATIENT
Start: 2024-05-11

## 2024-05-11 RX ORDER — ALBUTEROL SULFATE 90 UG/1
2 AEROSOL, METERED RESPIRATORY (INHALATION) 4 TIMES DAILY PRN
Qty: 18 G | Refills: 0 | Status: SHIPPED | OUTPATIENT
Start: 2024-05-11

## 2024-05-11 RX ORDER — CEFDINIR 300 MG/1
300 CAPSULE ORAL 2 TIMES DAILY
Qty: 14 CAPSULE | Refills: 0 | Status: SHIPPED | OUTPATIENT
Start: 2024-05-11 | End: 2024-05-18

## 2024-05-11 RX ORDER — POTASSIUM CHLORIDE 20 MEQ/1
40 TABLET, EXTENDED RELEASE ORAL ONCE
Status: COMPLETED | OUTPATIENT
Start: 2024-05-11 | End: 2024-05-11

## 2024-05-11 RX ORDER — DOXYCYCLINE HYCLATE 100 MG
100 TABLET ORAL 2 TIMES DAILY
Qty: 14 TABLET | Refills: 0 | Status: SHIPPED | OUTPATIENT
Start: 2024-05-11 | End: 2024-05-18

## 2024-05-11 RX ORDER — LANCETS 28 GAUGE
1 EACH MISCELLANEOUS DAILY
Qty: 100 EACH | Refills: 3 | Status: SHIPPED | OUTPATIENT
Start: 2024-05-11

## 2024-05-11 RX ORDER — METFORMIN HYDROCHLORIDE 500 MG/1
500 TABLET, EXTENDED RELEASE ORAL 2 TIMES DAILY WITH MEALS
Qty: 60 TABLET | Refills: 5 | Status: SHIPPED | OUTPATIENT
Start: 2024-05-11

## 2024-05-11 RX ADMIN — INSULIN LISPRO 2 UNITS: 100 INJECTION, SOLUTION INTRAVENOUS; SUBCUTANEOUS at 12:02

## 2024-05-11 RX ADMIN — POTASSIUM CHLORIDE 40 MEQ: 1500 TABLET, EXTENDED RELEASE ORAL at 09:48

## 2024-05-11 RX ADMIN — WATER 1000 MG: 1 INJECTION INTRAMUSCULAR; INTRAVENOUS; SUBCUTANEOUS at 01:36

## 2024-05-11 RX ADMIN — SODIUM CHLORIDE, PRESERVATIVE FREE 10 ML: 5 INJECTION INTRAVENOUS at 08:23

## 2024-05-11 RX ADMIN — IPRATROPIUM BROMIDE AND ALBUTEROL SULFATE 1 DOSE: 2.5; .5 SOLUTION RESPIRATORY (INHALATION) at 12:49

## 2024-05-11 RX ADMIN — IPRATROPIUM BROMIDE AND ALBUTEROL SULFATE 1 DOSE: 2.5; .5 SOLUTION RESPIRATORY (INHALATION) at 08:51

## 2024-05-11 RX ADMIN — DOXYCYCLINE HYCLATE 100 MG: 100 CAPSULE ORAL at 08:23

## 2024-05-11 NOTE — DISCHARGE SUMMARY
[facility]   Discharge summary   Patient ID:  Ralph Escobar  [id number]  59 y.o.  1965    Admit date: 5/9/2024    Discharge date and time: 5/11/2024    Admission Diagnoses:   Patient Active Problem List   Diagnosis    Tobacco abuse    COPD (chronic obstructive pulmonary disease) (HCC)    GERD (gastroesophageal reflux disease)    Community acquired bacterial pneumonia    Pneumonia due to human metapneumovirus       Discharge Diagnoses: ***    Consults: {consultation:12881}    Procedures: ***    Hospital Course: The patient is a 59 y.o. male of Herbert Alamo DO with significant past medical history of *** who presents with ***    Recent Labs     05/08/24  2314 05/10/24  0801 05/11/24  0420   WBC 13.9* 11.8* 8.4   HGB 15.1 13.2 13.1   HCT 45.4 42.9 41.1    126* 150       Recent Labs     05/08/24  2314 05/10/24  0534 05/11/24  0420    142 143   K 3.4* 3.7 3.3*   CL 98 106 104   CO2 27 28 29   BUN 14 17 15   CREATININE 0.8 0.7 0.7   CALCIUM 8.8 8.5* 8.4*       CTA PULMONARY W CONTRAST    Result Date: 5/9/2024  EXAMINATION: CTA OF THE CHEST 5/9/2024 12:17 am TECHNIQUE: CTA of the chest was performed after the administration of intravenous contrast.  Multiplanar reformatted images are provided for review.  MIP images are provided for review. Automated exposure control, iterative reconstruction, and/or weight based adjustment of the mA/kV was utilized to reduce the radiation dose to as low as reasonably achievable. COMPARISON: None. HISTORY: ORDERING SYSTEM PROVIDED HISTORY: eval for PE, PNA TECHNOLOGIST PROVIDED HISTORY: Reason for exam:->eval for PE, PNA Additional Contrast?->1 FINDINGS: Pulmonary Arteries: There is limited evaluation by suboptimal contrast opacification and artifacts of motion.  Within these confines, no definite acute pulmonary embolus is identified. Mediastinum: Hilar and lower mediastinal lymph nodes are prominent although not technically enlarged for example 1.1 cm

## 2024-05-11 NOTE — PROGRESS NOTES
[facility]   Progress note      Subjective:    The patient is awake and alert.  Sitting up in bed. Wants to go home.   No acute events overnight.    Denies chest pain, angina, SOB     Objective:    /76   Pulse 85   Temp 98 °F (36.7 °C) (Oral)   Resp 18   Ht 1.829 m (6')   Wt 121.2 kg (267 lb 3.2 oz)   SpO2 94%   BMI 36.24 kg/m²     In: 1243.8 [P.O.:180; I.V.:1063.8]  Out: -   In: 1243.8   Out: -     General appearance: NAD, conversant  HEENT: AT/NC, MMM  Neck: FROM, supple  Lungs: coarse breath sounds bl, on room air  CV: RRR, no MRGs  Vasc: Radial pulses 2+  Abdomen: Soft, non-tender; no masses or HSM  Extremities: No peripheral edema or digital cyanosis  Skin: no rash, lesions or ulcers  Psych: Alert and oriented to person, place and time  Neuro: Alert and interactive     Recent Labs     05/08/24  2314 05/10/24  0801 05/11/24  0420   WBC 13.9* 11.8* 8.4   HGB 15.1 13.2 13.1   HCT 45.4 42.9 41.1    126* 150       Recent Labs     05/08/24  2314 05/10/24  0534 05/11/24  0420    142 143   K 3.4* 3.7 3.3*   CL 98 106 104   CO2 27 28 29   BUN 14 17 15   CREATININE 0.8 0.7 0.7   CALCIUM 8.8 8.5* 8.4*       Assessment:    Principal Problem:    Community acquired bacterial pneumonia  Resolved Problems:    * No resolved hospital problems. *      Plan:    Patient is a 59-year-old male admitted to [facility] for  Community-acquired pneumonia  -Monitor labs  -WBC 13.9 > 11.8  -Check Pro-David  -Continue scheduled breathing treatments  -Continue IV Rocephin and Doxy  -IVF NS at 75  -Respiratory panel positive for human metapneumovirus, supportive care  -Currently requiring 3 L nasal cannula satting 94%     Hyperglycemia   -  -Check A1c  --A1c is 8.5, appears pt has diabetes. Will consult diabetes educator an dietary and initiate medication regimen     5/11/2024  --appreciate diabetes educator meeting with patient. Ultimately, will send pt home on oral therapies and he can f/u

## 2024-05-12 LAB
MICROORGANISM SPEC CULT: NORMAL
MICROORGANISM SPEC CULT: NORMAL
SERVICE CMNT-IMP: NORMAL
SERVICE CMNT-IMP: NORMAL
SPECIMEN DESCRIPTION: NORMAL
SPECIMEN DESCRIPTION: NORMAL

## 2024-11-08 ENCOUNTER — HOSPITAL ENCOUNTER (EMERGENCY)
Age: 59
Discharge: HOME | End: 2024-11-09
Payer: COMMERCIAL

## 2024-11-08 VITALS
HEIGHT: 72 IN | HEART RATE: 86 BPM | DIASTOLIC BLOOD PRESSURE: 76 MMHG | BODY MASS INDEX: 33.86 KG/M2 | TEMPERATURE: 98.2 F | SYSTOLIC BLOOD PRESSURE: 147 MMHG | WEIGHT: 250 LBS | OXYGEN SATURATION: 94 % | RESPIRATION RATE: 16 BRPM

## 2024-11-08 DIAGNOSIS — S61.213A LACERATION OF LEFT MIDDLE FINGER WITHOUT FOREIGN BODY WITHOUT DAMAGE TO NAIL, INITIAL ENCOUNTER: Primary | ICD-10-CM

## 2024-11-08 DIAGNOSIS — Z23 NEED FOR TETANUS BOOSTER: ICD-10-CM

## 2024-11-08 PROCEDURE — 6360000002 HC RX W HCPCS: Performed by: NURSE PRACTITIONER

## 2024-11-08 PROCEDURE — 90714 TD VACC NO PRESV 7 YRS+ IM: CPT | Performed by: NURSE PRACTITIONER

## 2024-11-08 PROCEDURE — 99284 EMERGENCY DEPT VISIT MOD MDM: CPT

## 2024-11-08 PROCEDURE — 90471 IMMUNIZATION ADMIN: CPT | Performed by: NURSE PRACTITIONER

## 2024-11-08 PROCEDURE — 12001 RPR S/N/AX/GEN/TRNK 2.5CM/<: CPT

## 2024-11-08 RX ORDER — LIDOCAINE HYDROCHLORIDE 10 MG/ML
5 INJECTION, SOLUTION INFILTRATION; PERINEURAL ONCE
Status: COMPLETED | OUTPATIENT
Start: 2024-11-08 | End: 2024-11-09

## 2024-11-08 RX ADMIN — CLOSTRIDIUM TETANI TOXOID ANTIGEN (FORMALDEHYDE INACTIVATED) AND CORYNEBACTERIUM DIPHTHERIAE TOXOID ANTIGEN (FORMALDEHYDE INACTIVATED) 0.5 ML: 5; 2 INJECTION, SUSPENSION INTRAMUSCULAR at 23:58

## 2024-11-08 ASSESSMENT — PAIN DESCRIPTION - ORIENTATION: ORIENTATION: LEFT

## 2024-11-08 ASSESSMENT — PAIN DESCRIPTION - FREQUENCY: FREQUENCY: CONTINUOUS

## 2024-11-08 ASSESSMENT — PAIN DESCRIPTION - LOCATION: LOCATION: HAND

## 2024-11-08 ASSESSMENT — PAIN - FUNCTIONAL ASSESSMENT
PAIN_FUNCTIONAL_ASSESSMENT: 0-10
PAIN_FUNCTIONAL_ASSESSMENT: ACTIVITIES ARE NOT PREVENTED

## 2024-11-08 ASSESSMENT — PAIN SCALES - GENERAL: PAINLEVEL_OUTOF10: 3

## 2024-11-08 ASSESSMENT — PAIN DESCRIPTION - ONSET: ONSET: ON-GOING

## 2024-11-08 ASSESSMENT — PAIN DESCRIPTION - DESCRIPTORS: DESCRIPTORS: ACHING

## 2024-11-08 ASSESSMENT — PAIN DESCRIPTION - PAIN TYPE: TYPE: ACUTE PAIN

## 2024-11-09 PROCEDURE — 2500000003 HC RX 250 WO HCPCS: Performed by: NURSE PRACTITIONER

## 2024-11-09 RX ADMIN — LIDOCAINE HYDROCHLORIDE 5 ML: 10 INJECTION, SOLUTION INFILTRATION; PERINEURAL at 00:39

## 2024-11-09 ASSESSMENT — PAIN SCALES - GENERAL: PAINLEVEL_OUTOF10: 0

## 2024-11-09 NOTE — ED PROVIDER NOTES
[facility]  Department of Emergency Medicine   ED  Encounter Note  Admit Date/RoomTime: 2024 10:47 PM  ED Room:     NAME: Ralph Escobar  : 1965  MRN: [id number]     Chief Complaint:  Laceration (Sustained Lacerations to 2nd, 3rd and 4th fingers while using a knife to cut wire)    History of Present Illness        Ralph Escobar is a 59 y.o. old male presenting to the emergency department by private vehicle, for traumatic Left Middle Finger pain which occured several minute(s) prior to arrival.  The complaint is due to cut with a pocket knife.  Since onset the symptoms have been stable.  Patient has no prior history of pain/injury with regards to today's visit.  His pain is aggraveated by any movement and relieved by nothing.  He is right handed.    Tetanus Status: unknown.    ROS   Pertinent positives and negatives are stated within HPI, all other systems reviewed and are negative.    Past Medical History:  has a past medical history of Arthritis, Asthma, Back pain, COPD (chronic obstructive pulmonary disease) (HCC), Depression, Diverticulitis, GERD (gastroesophageal reflux disease), Sinus problem, and Tobacco abuse.    Surgical History:  has a past surgical history that includes Nerve Block and Tonsillectomy and adenoidectomy.    Social History:  reports that he has quit smoking. His smoking use included cigarettes. He has a 60 pack-year smoking history. His smokeless tobacco use includes snuff. He reports current alcohol use. He reports that he does not use drugs.    Family History: family history is not on file.     Allergies: Shellfish-derived products, Ultram [tramadol], Iodine, and Lyrica [pregabalin]    Physical Exam   Oxygen Saturation Interpretation: Normal.        ED Triage Vitals [24 2227]   BP Systolic BP Percentile Diastolic BP Percentile Temp Temp Source Pulse Respirations SpO2   (!) 147/76 -- -- 98.2 °F (36.8 °C) Oral 86 16 94 %      Height Weight -

## 2024-11-09 NOTE — ED NOTES
Patient states employer faxed over the [facility] information needed.

## 2025-01-14 ENCOUNTER — HOSPITAL ENCOUNTER (EMERGENCY)
Age: 60
Discharge: HOME | End: 2025-01-14
Payer: COMMERCIAL

## 2025-01-14 VITALS
HEART RATE: 89 BPM | DIASTOLIC BLOOD PRESSURE: 78 MMHG | TEMPERATURE: 98 F | SYSTOLIC BLOOD PRESSURE: 121 MMHG | RESPIRATION RATE: 18 BRPM | OXYGEN SATURATION: 93 %

## 2025-01-14 DIAGNOSIS — T15.91XA FOREIGN BODY OF RIGHT EYE, INITIAL ENCOUNTER: Primary | ICD-10-CM

## 2025-01-14 PROCEDURE — 99283 EMERGENCY DEPT VISIT LOW MDM: CPT

## 2025-01-14 PROCEDURE — 6370000000 HC RX 637 (ALT 250 FOR IP): Performed by: PHYSICIAN ASSISTANT

## 2025-01-14 RX ORDER — TOBRAMYCIN 3 MG/ML
1 SOLUTION/ DROPS OPHTHALMIC EVERY 4 HOURS
Qty: 5 ML | Refills: 0 | Status: SHIPPED | OUTPATIENT
Start: 2025-01-14 | End: 2025-01-24

## 2025-01-14 RX ORDER — TETRACAINE HYDROCHLORIDE 5 MG/ML
2 SOLUTION OPHTHALMIC ONCE
Status: COMPLETED | OUTPATIENT
Start: 2025-01-14 | End: 2025-01-14

## 2025-01-14 RX ADMIN — TETRACAINE HYDROCHLORIDE 2 DROP: 5 SOLUTION OPHTHALMIC at 21:57

## 2025-01-14 ASSESSMENT — LIFESTYLE VARIABLES
HOW OFTEN DO YOU HAVE A DRINK CONTAINING ALCOHOL: MONTHLY OR LESS
HOW MANY STANDARD DRINKS CONTAINING ALCOHOL DO YOU HAVE ON A TYPICAL DAY: 1 OR 2

## 2025-01-15 NOTE — ED PROVIDER NOTES
Independent MAHENDRA Visit.           [facility] EMERGENCY DEPARTMENT  ED  Encounter Note  Admit Date/RoomTime: 2025  9:41 PM  ED Room:   NAME: Ralph Escobar  : 1965  MRN: [id number]  PCP: Pato Chou MD    CHIEF COMPLAINT     Foreign Body in Eye (Rt eye)    HISTORY OF PRESENT ILLNESS        Ralph Escobar is a 60 y.o. male who presents to the ED by private vehicle for FB right eye, beginning just prior to arrival. The complaint has been persistent and are mild in severity.  The patient states that he developed a foreign body sensation in his right eye just prior to arrival.  States that he is not really certain what happened.  He was cleaning off his car and wonders if he got something in his eye as he was doing that.  States he does not remember anything specifically going into the eye.  He has a burning discomfort and foreign body sensation.  Increased tearing noted.  Patient has not been ill.  Denies fever or chills.   Last tetanus     REVIEW OF SYSTEMS     Pertinent positives and negatives are stated within HPI, all other systems reviewed and are negative.    Past Medical History:  has a past medical history of Arthritis, Asthma, Back pain, COPD (chronic obstructive pulmonary disease) (HCC), Depression, Diverticulitis, GERD (gastroesophageal reflux disease), Sinus problem, and Tobacco abuse.  Surgical History:  has a past surgical history that includes Nerve Block and Tonsillectomy and adenoidectomy.  Social History:  reports that he has quit smoking. His smoking use included cigarettes. He has a 60 pack-year smoking history. His smokeless tobacco use includes snuff. He reports current alcohol use. He reports that he does not use drugs.  Family History: family history is not on file.   Allergies: Shellfish-derived products, Ultram [tramadol], Iodine, and Lyrica [pregabalin]  CURRENT MEDICATIONS       Previous Medications    BLOOD GLUCOSE MONITORING SUPPL